# Patient Record
Sex: MALE | URBAN - METROPOLITAN AREA
[De-identification: names, ages, dates, MRNs, and addresses within clinical notes are randomized per-mention and may not be internally consistent; named-entity substitution may affect disease eponyms.]

---

## 2018-05-30 ENCOUNTER — APPOINTMENT (OUTPATIENT)
Dept: URBAN - METROPOLITAN AREA SURGERY 6 | Age: 83
Setting detail: DERMATOLOGY
End: 2018-05-30

## 2018-05-30 DIAGNOSIS — L72.11 PILAR CYST: ICD-10-CM

## 2018-05-30 PROBLEM — I25.10 ATHEROSCLEROTIC HEART DISEASE OF NATIVE CORONARY ARTERY WITHOUT ANGINA PECTORIS: Status: ACTIVE | Noted: 2018-05-30

## 2018-05-30 PROBLEM — E78.5 HYPERLIPIDEMIA, UNSPECIFIED: Status: ACTIVE | Noted: 2018-05-30

## 2018-05-30 PROBLEM — N40.0 BENIGN PROSTATIC HYPERPLASIA WITHOUT LOWER URINARY TRACT SYMPTOMS: Status: ACTIVE | Noted: 2018-05-30

## 2018-05-30 PROBLEM — K21.9 GASTRO-ESOPHAGEAL REFLUX DISEASE WITHOUT ESOPHAGITIS: Status: ACTIVE | Noted: 2018-05-30

## 2018-05-30 PROBLEM — H91.90 UNSPECIFIED HEARING LOSS, UNSPECIFIED EAR: Status: ACTIVE | Noted: 2018-05-30

## 2018-05-30 PROBLEM — M12.9 ARTHROPATHY, UNSPECIFIED: Status: ACTIVE | Noted: 2018-05-30

## 2018-05-30 PROBLEM — Z85.46 PERSONAL HISTORY OF MALIGNANT NEOPLASM OF PROSTATE: Status: ACTIVE | Noted: 2018-05-30

## 2018-05-30 PROBLEM — I63.50 CEREBRAL INFARCTION DUE TO UNSPECIFIED OCCLUSION OR STENOSIS OF UNSPECIFIED CEREBRAL ARTERY: Status: ACTIVE | Noted: 2018-05-30

## 2018-05-30 PROBLEM — I48.91 UNSPECIFIED ATRIAL FIBRILLATION: Status: ACTIVE | Noted: 2018-05-30

## 2018-05-30 PROBLEM — I10 ESSENTIAL (PRIMARY) HYPERTENSION: Status: ACTIVE | Noted: 2018-05-30

## 2018-05-30 PROCEDURE — 99202 OFFICE O/P NEW SF 15 MIN: CPT

## 2018-05-30 PROCEDURE — OTHER COUNSELING: OTHER

## 2018-05-30 PROCEDURE — OTHER TREATMENT REGIMEN: OTHER

## 2018-05-30 PROCEDURE — OTHER CONSULTATION EXCISION: OTHER

## 2018-05-30 ASSESSMENT — LOCATION DETAILED DESCRIPTION DERM: LOCATION DETAILED: POSTERIOR MID-PARIETAL SCALP

## 2018-05-30 ASSESSMENT — LOCATION SIMPLE DESCRIPTION DERM: LOCATION SIMPLE: POSTERIOR SCALP

## 2018-05-30 ASSESSMENT — LOCATION ZONE DERM: LOCATION ZONE: SCALP

## 2018-05-30 NOTE — HPI: SKIN LESION
How Severe Is Your Skin Lesion?: moderate
Has Your Skin Lesion Been Treated?: not been treated
Is This A New Presentation, Or A Follow-Up?: Growth
Additional History: Patient had lesion drained in ER last week and patient is currently on Bactrim.

## 2018-06-25 ENCOUNTER — APPOINTMENT (OUTPATIENT)
Dept: URBAN - METROPOLITAN AREA SURGERY 6 | Age: 83
Setting detail: DERMATOLOGY
End: 2018-06-25

## 2018-06-25 DIAGNOSIS — L72.11 PILAR CYST: ICD-10-CM

## 2018-06-25 PROCEDURE — OTHER PATHOLOGY BILLING: OTHER

## 2018-06-25 PROCEDURE — 88304 TISSUE EXAM BY PATHOLOGIST: CPT | Mod: TC

## 2018-06-25 PROCEDURE — OTHER OTHER: OTHER

## 2018-06-25 PROCEDURE — 11422 EXC H-F-NK-SP B9+MARG 1.1-2: CPT

## 2018-06-25 PROCEDURE — OTHER PUNCH EXCISION: OTHER

## 2018-06-25 ASSESSMENT — LOCATION ZONE DERM: LOCATION ZONE: SCALP

## 2018-06-25 ASSESSMENT — LOCATION DETAILED DESCRIPTION DERM: LOCATION DETAILED: POSTERIOR MID-PARIETAL SCALP

## 2018-06-25 ASSESSMENT — LOCATION SIMPLE DESCRIPTION DERM: LOCATION SIMPLE: POSTERIOR SCALP

## 2018-06-25 NOTE — PROCEDURE: PATHOLOGY BILLING
Immunohistochemistry ([id number] and [id number]) billing is not performed here. Please use the Immunohistochemistry Stain Billing plan to accomplish this. Immunohistochemistry ([id number] and [id number]) billing is not performed here. Please use the Immunohistochemistry Stain Billing plan to accomplish this.

## 2018-06-25 NOTE — PROCEDURE: PUNCH EXCISION
Medical Necessity Clause: The excision was medically necessary because the lesion which was excised was enlarging, painful, subject to repeated physical trauma

## 2018-06-25 NOTE — PROCEDURE: OTHER
Note Text (......Xxx Chief Complaint.): This diagnosis correlates with the cysts on scalp
Other (Free Text): Orders signed, photo in [facility].
Detail Level: Simple

## 2023-04-20 NOTE — PROCEDURE: PUNCH EXCISION
I have personally seen and examined the patient. I have collaborated with and supervised the Medical Necessity Clause: This procedure was medically necessary because the lesion that was treated was:growing and irritated